# Patient Record
Sex: MALE | Race: WHITE | NOT HISPANIC OR LATINO | URBAN - NONMETROPOLITAN AREA
[De-identification: names, ages, dates, MRNs, and addresses within clinical notes are randomized per-mention and may not be internally consistent; named-entity substitution may affect disease eponyms.]

---

## 2021-01-18 ENCOUNTER — IMMUNIZATION (OUTPATIENT)
Dept: VACCINE CLINIC | Facility: HOSPITAL | Age: 46
End: 2021-01-18

## 2021-01-18 PROCEDURE — 91300 HC SARSCOV02 VAC 30MCG/0.3ML IM: CPT | Performed by: FAMILY MEDICINE

## 2021-01-18 PROCEDURE — 0001A: CPT | Performed by: FAMILY MEDICINE

## 2021-02-08 ENCOUNTER — IMMUNIZATION (OUTPATIENT)
Dept: VACCINE CLINIC | Facility: HOSPITAL | Age: 46
End: 2021-02-08

## 2021-02-08 PROCEDURE — 91300 HC SARSCOV02 VAC 30MCG/0.3ML IM: CPT | Performed by: INTERNAL MEDICINE

## 2021-02-08 PROCEDURE — 0002A: CPT | Performed by: INTERNAL MEDICINE

## 2023-12-19 ENCOUNTER — HOSPITAL ENCOUNTER (EMERGENCY)
Facility: HOSPITAL | Age: 48
Discharge: HOME | End: 2023-12-19
Attending: EMERGENCY MEDICINE | Admitting: EMERGENCY MEDICINE
Payer: COMMERCIAL

## 2023-12-19 VITALS
RESPIRATION RATE: 20 BRPM | HEIGHT: 73 IN | BODY MASS INDEX: 21.87 KG/M2 | WEIGHT: 165 LBS | OXYGEN SATURATION: 97 % | TEMPERATURE: 97.6 F | SYSTOLIC BLOOD PRESSURE: 134 MMHG | DIASTOLIC BLOOD PRESSURE: 105 MMHG | HEART RATE: 72 BPM

## 2023-12-19 DIAGNOSIS — Z79.899 CHRONIC PRESCRIPTION BENZODIAZEPINE USE: Primary | ICD-10-CM

## 2023-12-19 PROCEDURE — 99282 EMERGENCY DEPT VISIT SF MDM: CPT

## 2023-12-19 NOTE — ED PROVIDER NOTES
[facility]    EMERGENCY DEPARTMENT ENCOUNTER      Pt Name: Ru Medina  MRN: [id number]  YOB: 1975  Date of evaluation: 12/19/2023  Provider: Claudio Galeas DO    CHIEF COMPLAINT       Chief Complaint   Patient presents with    Withdrawal       HPI  Stated Reason for Visit: PT PRESENTS TO ER WITH C/O OF WITHDRAWL FROM XANAX. PT HAS BEEN TAKEN 12 PER DAY. 1 XANAX TAKEN AT 5AM THIS MORNING PER PATIENT. NAD NOTED IN TRIAGE       HISTORY OF PRESENT ILLNESS  (Location/Symptom, Timing/Onset, Context/Setting, Quality, Duration, Modifying Factors, Severity.)   Ru Medina is a 48 y.o. male who presents to the emergency department for evaluation of a concern for his chronic benzodiazepine use.  Has been taking more than he has been prescribed from his psychiatrist Dr. Rod.  He still has a few left, has been taking doses as early as this morning.  No other acute complaints, asking for more of his benzodiazepine medications for tapering.      Nursing notes were reviewed.      PAST MEDICAL HISTORY   No past medical history on file.      SURGICAL HISTORY     No past surgical history on file.      CURRENT MEDICATIONS     No current facility-administered medications for this encounter.    Current Outpatient Medications:     ALPRAZolam (XANAX) 1 MG tablet, Take 1 tablet by mouth., Disp: , Rfl:     amLODIPine (NORVASC) 5 MG tablet, Take 1 tablet by mouth Daily., Disp: , Rfl:     clonazePAM (KlonoPIN) 1 MG tablet, , Disp: , Rfl:     cloNIDine (CATAPRES) 0.1 MG tablet, Take 1 tablet by mouth 2 (Two) Times a Day., Disp: , Rfl:     DULoxetine (Cymbalta) 20 MG capsule, Every 12 (Twelve) Hours., Disp: , Rfl:     escitalopram (LEXAPRO) 20 MG tablet, Take 1 tablet by mouth Daily., Disp: , Rfl:     hydrOXYzine (ATARAX) 25 MG tablet, Take 1 tablet by mouth., Disp: , Rfl:     ALLERGIES     Patient has no known allergies.    FAMILY HISTORY     No family history on file.       SOCIAL HISTORY       Social History  "    Socioeconomic History    Marital status:    Tobacco Use    Smoking status: Never    Smokeless tobacco: Never   Vaping Use    Vaping Use: Every day    Substances: Nicotine   Substance and Sexual Activity    Alcohol use: Not Currently    Drug use: Never    Sexual activity: Defer         PHYSICAL EXAM    (up to 7 for level 4, 8 or more for level 5)     Vitals:    12/19/23 1047   BP: (!) 134/105   BP Location: Left arm   Patient Position: Sitting   Pulse: 72   Resp: 20   Temp: 97.6 °F (36.4 °C)   TempSrc: Oral   SpO2: 97%   Weight: 74.8 kg (165 lb)   Height: 185.4 cm (73\")       Physical Exam  General : Patient is awake, alert, oriented, in no acute distress, nontoxic appearing  Cardiac: Heart regular rate, rhythm  Lungs: Lungs with no acute respiratory distress  Neuro: GCS 15  Dermatology: Skin is warm and dry  Psych: Mentation is grossly normal, cognition is grossly normal. Affect is appropriate      DIAGNOSTIC RESULTS         ED BEDSIDE ULTRASOUND:   Performed by ED Physician - none    LABS:    I have reviewed and interpreted all of the currently available lab results from this visit (if applicable):  No results found for this or any previous visit.     If labs were ordered, I independently reviewed the results and considered them in treating the patient.      EMERGENCY DEPARTMENT COURSE and DIFFERENTIAL DIAGNOSIS/MDM:   Vitals:  AS OF 11:38 EST    BP - (!) 134/105  HR - 72  TEMP - 97.6 °F (36.4 °C) (Oral)  O2 SATS - 97%      Orders placed during this visit:  No orders of the defined types were placed in this encounter.      All labs have been independently reviewed by me.  All radiology studies have been reviewed by me and the radiologist dictating the report.  All EKG's have been independently viewed and interpreted by me.      Discussion below represents my analysis of pertinent findings related to patient's condition, differential diagnosis, treatment plan and final disposition.    Differential " diagnosis:  The differential diagnosis associated with the patient's presentation includes: Chronic benzodiazepine usage    Additional sources  Discussed/ obtained information from independent historians:   [] Spouse  [] Parent  [] Family member  [] Friend  [] EMS   [] Other:    External (non-ED) record review:   [] Inpatient record:   [] Office record:   [] Outpatient record:   [] Prior Outpatient labs:   [] Prior Outpatient radiology:   [] Primary Care record:   [] Outside ED record:   [] Other:     Patient's care impacted by:   [] Diabetes  [] Hypertension  [] CHF  [] Hyperlipidemia  [] Coronary Artery Disease   [] COPD   [] Cancer   [] Tobacco Abuse   [] Substance Abuse    [] Other:     Care significantly affected by Social Determinants of Health (housing and economic circumstances, unemployment)    [] Yes     [x] No   If yes, Patient's care significantly limited by Social Determinants of Health including:   [] Inadequate housing   [] Low income   [] Alcoholism and drug addiction in family   [] Problems related to primary support group   [] Unemployment   [] Problems related to employment   [] Other Social Determinants of Health:       MEDICATIONS ADMINISTERED IN ED:  Medications - No data to display    ED Course as of 12/19/23 1138   Tue Dec 19, 2023   [facility] Patient Controlled Substance Report (from 12/19/2022 to 12/19/2023)    Dispensed  Strength Quantity Days Supply Provider Pharmacy  12/14/2023 Alprazolam 1MG 120 each 30 [id number]  12/04/2023 Clonazepam 1MG 44 each 11 BUNGE,BERRY [id number]  11/16/2023 Alprazolam 1MG 120 each 30 BUNGE,BERRY [id number]  10/18/2023 Alprazolam 1MG 120 each 30 BUNGE,BERRY [id number]  09/21/2023 Alprazolam 1MG 120 each 30 BUNGE,BERRY [id number]  08/26/2023 Alprazolam 1MG 60 each 30 BUNGEBERRY  07/25/2023 [facility]MG 60 [id number]  06/01/2023 Alprazolam 1MG 30 each 15 [id number]       [AP]      ED Course User Index  [AP] Claudio Galeas DO         Patient with underlying psychiatric history who is being prescribed monthly alprazolam No. 120 through Dr. Rod his psychiatrist, YO Cornelius reveals a most recent diagnosis from December 14 with #120 alprazolam 1 mg tablets.  Currently has an active prescription, will not prescribe any further medications he needs to follow-up with his psychiatrist.  No signs of acute benzodiazepine withdrawal.  I had a discussion with the patient/family regarding diagnosis, diagnostic results, treatment plan, and medications.   The patient/family expressed understanding and agreement with this plan.  The patient will follow-up with their PCP in 1-2 days for reevaluation.     PROCEDURES:  Procedures    CRITICAL CARE TIME    Total Critical Care time was 0 minutes, excluding separately reportable procedures.   There was a high probability of clinically significant/life threatening deterioration in the patient's condition which required my urgent intervention.      FINAL IMPRESSION      1. Chronic prescription benzodiazepine use          DISPOSITION/PLAN     ED Disposition       ED Disposition   Discharge    Condition   Stable    Comment   --               PATIENT REFERRED TO:  Ehsan Rod MD  [address]  [place] [id number] [phone]    Schedule an appointment as soon as possible for a visit   Your psychiatrist    David Ulrich PA  [address] [facility] [address]  [place] [id number] [phone]    In 2 days        DISCHARGE MEDICATIONS:     Medication List        CONTINUE taking these medications      ALPRAZolam 1 MG tablet  Commonly known as: XANAX     amLODIPine 5 MG tablet  Commonly known as: NORVASC     clonazePAM 1 MG tablet  Commonly known as: KlonoPIN     cloNIDine 0.1 MG tablet  Commonly known as: CATAPRES     Cymbalta 20 MG capsule  Generic drug:  DULoxetine     escitalopram 20 MG tablet  Commonly known as: LEXAPRO     hydrOXYzine 25 MG tablet  Commonly known as: ATARAX                  Comment: Please note this report has been produced using speech recognition software.      Claudio Galeas DO  Attending Emergency Physician         Claudio Galeas,   12/19/23 9280